# Patient Record
Sex: MALE | Race: BLACK OR AFRICAN AMERICAN | URBAN - METROPOLITAN AREA
[De-identification: names, ages, dates, MRNs, and addresses within clinical notes are randomized per-mention and may not be internally consistent; named-entity substitution may affect disease eponyms.]

---

## 2023-04-18 LAB
ANION GAP IN SER/PLAS: 13 MMOL/L (ref 10–20)
BASOPHILS (10*3/UL) IN BLOOD BY AUTOMATED COUNT: 0.05 X10E9/L (ref 0–0.1)
BASOPHILS/100 LEUKOCYTES IN BLOOD BY AUTOMATED COUNT: 1 % (ref 0–2)
CALCIUM (MG/DL) IN SER/PLAS: 9 MG/DL (ref 8.6–10.3)
CARBON DIOXIDE, TOTAL (MMOL/L) IN SER/PLAS: 27 MMOL/L (ref 21–32)
CHLORIDE (MMOL/L) IN SER/PLAS: 106 MMOL/L (ref 98–107)
CREATININE (MG/DL) IN SER/PLAS: 0.85 MG/DL (ref 0.5–1.3)
EOSINOPHILS (10*3/UL) IN BLOOD BY AUTOMATED COUNT: 0.13 X10E9/L (ref 0–0.7)
EOSINOPHILS/100 LEUKOCYTES IN BLOOD BY AUTOMATED COUNT: 2.6 % (ref 0–6)
ERYTHROCYTE DISTRIBUTION WIDTH (RATIO) BY AUTOMATED COUNT: 14 % (ref 11.5–14.5)
ERYTHROCYTE MEAN CORPUSCULAR HEMOGLOBIN CONCENTRATION (G/DL) BY AUTOMATED: 35.6 G/DL (ref 32–36)
ERYTHROCYTE MEAN CORPUSCULAR VOLUME (FL) BY AUTOMATED COUNT: 97 FL (ref 80–100)
ERYTHROCYTES (10*6/UL) IN BLOOD BY AUTOMATED COUNT: 5.08 X10E12/L (ref 4.5–5.9)
GFR MALE: >90 ML/MIN/1.73M2
GLUCOSE (MG/DL) IN SER/PLAS: 72 MG/DL (ref 74–99)
HEMATOCRIT (%) IN BLOOD BY AUTOMATED COUNT: 49.1 % (ref 41–52)
HEMOGLOBIN (G/DL) IN BLOOD: 17.5 G/DL (ref 13.5–17.5)
IMMATURE GRANULOCYTES/100 LEUKOCYTES IN BLOOD BY AUTOMATED COUNT: 0.2 % (ref 0–0.9)
LEUKOCYTES (10*3/UL) IN BLOOD BY AUTOMATED COUNT: 5 X10E9/L (ref 4.4–11.3)
LYMPHOCYTES (10*3/UL) IN BLOOD BY AUTOMATED COUNT: 1.9 X10E9/L (ref 1.2–4.8)
LYMPHOCYTES/100 LEUKOCYTES IN BLOOD BY AUTOMATED COUNT: 38.1 % (ref 13–44)
MONOCYTES (10*3/UL) IN BLOOD BY AUTOMATED COUNT: 0.52 X10E9/L (ref 0.1–1)
MONOCYTES/100 LEUKOCYTES IN BLOOD BY AUTOMATED COUNT: 10.4 % (ref 2–10)
NEUTROPHILS (10*3/UL) IN BLOOD BY AUTOMATED COUNT: 2.38 X10E9/L (ref 1.2–7.7)
NEUTROPHILS/100 LEUKOCYTES IN BLOOD BY AUTOMATED COUNT: 47.7 % (ref 40–80)
NRBC (PER 100 WBCS) BY AUTOMATED COUNT: 0 /100 WBC (ref 0–0)
PLATELETS (10*3/UL) IN BLOOD AUTOMATED COUNT: 279 X10E9/L (ref 150–450)
POTASSIUM (MMOL/L) IN SER/PLAS: 3.9 MMOL/L (ref 3.5–5.3)
SODIUM (MMOL/L) IN SER/PLAS: 142 MMOL/L (ref 136–145)
UREA NITROGEN (MG/DL) IN SER/PLAS: 13 MG/DL (ref 6–23)

## 2023-04-20 ENCOUNTER — HOSPITAL ENCOUNTER (OUTPATIENT)
Dept: DATA CONVERSION | Facility: HOSPITAL | Age: 43
End: 2023-04-20
Attending: SURGERY | Admitting: SURGERY

## 2023-04-20 DIAGNOSIS — F17.200 NICOTINE DEPENDENCE, UNSPECIFIED, UNCOMPLICATED: ICD-10-CM

## 2023-04-20 DIAGNOSIS — K40.90 UNILATERAL INGUINAL HERNIA, WITHOUT OBSTRUCTION OR GANGRENE, NOT SPECIFIED AS RECURRENT: ICD-10-CM

## 2023-04-20 DIAGNOSIS — D17.6 BENIGN LIPOMATOUS NEOPLASM OF SPERMATIC CORD: ICD-10-CM

## 2023-05-09 LAB
COMPLETE PATHOLOGY REPORT: NORMAL
CONVERTED CLINICAL DIAGNOSIS-HISTORY: NORMAL
CONVERTED FINAL DIAGNOSIS: NORMAL
CONVERTED FINAL REPORT PDF LINK TO COPY AND PASTE: NORMAL
CONVERTED GROSS DESCRIPTION: NORMAL

## 2023-09-07 VITALS — HEIGHT: 69 IN | BODY MASS INDEX: 30.11 KG/M2 | WEIGHT: 203.26 LBS

## 2023-09-14 NOTE — H&P
History & Physical Reviewed:   I have reviewed the History and Physical dated:  20-Apr-2023   History and Physical reviewed and relevant findings noted. Patient examined to review pertinent physical  findings.: No significant changes   Home Medications Reviewed: no changes noted   Allergies Reviewed: no changes noted       ERAS (Enhanced Recovery After Surgery):  ·  ERAS Patient: no     Consent:   COVID-19 Consent:  ·  COVID-19 Risk Consent Surgeon has reviewed key risks related to the risk of linda COVID-19 and if they contract COVID-19 what the risks are.       Electronic Signatures:  Aurelio Hensley)  (Signed 20-Apr-2023 11:19)   Authored: History & Physical Reviewed, ERAS, Consent,  Note Completion      Last Updated: 20-Apr-2023 11:19 by Aurelio Hensley)

## 2025-04-29 NOTE — OP NOTE
Post Operative Note:     PreOp Diagnosis: Reducible right inguinal hernia   Post-Procedure Diagnosis: 1.  Reducible right inguinal  hernia  2.  Lipoma of the right spermatic cord   Procedure: 1.  Open repair of reducible right inguinal  hernia with mesh (Morteza tension-free hernioplasty)  2.  Excision of lipoma of the cord, right   Surgeon: Aurelio Hensley MD   Resident/Fellow/Other Assistant: Thelma Valdez S.A.;  Eduardo Castellanos SA   Anesthesia: Monitored anesthesia care with a total  of 31 cc of 1% lidocaine and 20 cc of 0.5% Marcaine with epinephrine local infiltrative anesthesia   I.V. Fluids: 1000 cc   Estimated Blood Loss (mL): Less than 5 cc   Specimen: no   Complications: See below   Findings: See below     Operative Report Dictated:  Dictation: not applicable - note contains Operative  Report   Operative Report:    Findings:   The patient had a chronically scarred indirect hernia on the right side; this was intensely scarred to the spermatic cord; there was no direct component; there was a lipoma of the cord;   The ilioinguinal nerve was identified and preserved throughout the case.      Specimens:  Lipoma of the cord, right  Indirect hernia sac    Procedure:    The patient was taken to the operating room and placed on the table in the supine position.  Preoperative huddle was accomplished with the OR team and the patient.  Satisfactory IV sedation was accomplished by the anesthesia service. The right inguinal  region was marked by the surgeon preoperatively. The bilateral inguinal regions and external genitalia were prepared and draped in the normal sterile fashion.  The case commenced at the appropriate time out.    A 6 cm incision was marked in the right inguinal region starting at the pubic tubercle and extending laterally along an inguinal skin crease, matching the contralateral scar. The SQ tissues and fascial planes along this incision were anesthetized using  the above noted local  ----- Message from Leigh Lira DO sent at 4/29/2025  1:03 PM CDT -----  Small lung nodules, none concerning. Continue annual LDCT if agrees.   infiltrative anesthesia.    The skin and subcutaneous tissues and Afshan's fascia were divided down to the external oblique aponeurosis, which was dissected free as was the external ring. Superficial epigastric superficial vessels were ligated and divided using 3-0 Vicryl suture  during this dissection.    The external oblique aponeurosis was divided in the direction of its fibers through the external ring, exposing the structures of the inguinal canal. The spermatic cord was circumferentially dissected free and elevated. The hernia sac was identified,  circumferentially dissected free proximally to the internal ring and preperitoneal fat, twisted, and a double high ligation was accomplished using a 2-0 silk suture ligature and tie.  The hernia sac was amputated and sent to pathology as a specimen.    The lipoma of the cord was identified and this was dissected free proximally.  This was ligated by using 2-0 silk suture ligature.  The lipoma was sent to pathology as a specimen.    Following this, the borders of the inguinal canal were identified and dissected free including the fascia of the pubic tubercle medially, the conjoined tendon superiorly, the shelving edge of Poupart's ligament or the inguinal ligament inferiorly. The  ilioinguinal nerve was identified during the case, dissected free and preserved throughout the case.    The floor of the inguinal canal was then reconstructed using a large preshaped piece of polypropylene mesh by [employer] (13.7 x 5.9 cm).    The mesh was trimmed to the proper size.   A lateral slit was made in the mesh for aperture.      The apex of the mesh was secured to the fascia of the pubic tubercle using a double-armed 2-0 Prolene suture. Using one end of the suture material, the inferior edge of mesh was secured to the shelving edge of Poupart's ligament in a running fashion from  medial to lateral. In a similar fashion, superior edge of the mesh was secured to the conjoined tendon in a  running fashion from medial to lateral. The ilioinguinal nerve was then placed back to its normal anatomic position along the cord.  The 2 lateral  wings of the mesh were then secured to each other and to the internal ring using interrupted and running 2-0 Prolene sutures, forming a new internal ring. This completed the repair.    The spermatic cord and testicle were placed back into its normal anatomic position. The external oblique aponeurosis was closed along its' fibers using running 3-0 Vicryl suture, forming a new external ring. Subcutaneous tissues and Afshan's fascia were  approximated using 3-0 Vicryl sutures, and the skin was approximated using running 4-0 undyed subcuticular Vicryl suture.    The wound was clean and dried and benzoin and Steri-Strips were placed followed by a dry sterile Bioclusive dressing.    The patient tolerated the procedure well. Sponge and needle and instrument counts were correct times 2. Total IVF were 1000 cc crystalloid and EBL was less than 5 cc. The patient was taken from the operating room to the PACU with stable vital signs and  in excellent condition.    Aurelio Hensley M.D.    Attestation:   Note Completion:  Attending Attestation I performed the procedure without a resident         Electronic Signatures:  Aurelio Hensley)  (Signed 14-May-2023 22:10)   Authored: Post Operative Note, Note Completion      Last Updated: 14-May-2023 22:10 by Aurelio Hensley)